# Patient Record
Sex: MALE | Race: WHITE
[De-identification: names, ages, dates, MRNs, and addresses within clinical notes are randomized per-mention and may not be internally consistent; named-entity substitution may affect disease eponyms.]

---

## 2017-01-16 ENCOUNTER — HOSPITAL ENCOUNTER (OUTPATIENT)
Dept: HOSPITAL 53 - M RAD | Age: 67
End: 2017-01-16
Attending: NURSE PRACTITIONER
Payer: MEDICARE

## 2017-01-16 DIAGNOSIS — E78.00: ICD-10-CM

## 2017-01-16 DIAGNOSIS — I10: Primary | ICD-10-CM

## 2017-01-16 DIAGNOSIS — K80.20: ICD-10-CM

## 2017-01-16 DIAGNOSIS — Z82.49: ICD-10-CM

## 2017-01-16 NOTE — REP
Abdominal aortic sonography:

 

History:  Family history of abdominal aortic aneurysm.  Hypercholesterolemia.

Tobacco use.

 

Findings:  Incidental note is made of an 8 mm gallstone in the gallbladder.

Common bile duct is normal measuring 0.3 cm.  The abdominal aorta measures 1.9 x

2.1 cm in AP by transverse dimension at the diaphragmatic hiatus.  At mid aorta,

the corresponding dimensions are 1.9 x 1.9 cm.  There is mild atherosclerotic

change throughout the abdominal aorta.  The distal aorta measures 1.6 x 2.1 cm.

No aneurysm is seen.  Right and left common iliac arteries are normal in caliber

measuring 1.0 and 0.7 cm in AP dimension respectively.

 

Impression:

 

No evidence of abdominal aortic aneurysm.  Gallstones seen.

 

 

Signed by

Daniel Chandler MD 01/16/2017 03:33 P

## 2017-08-15 ENCOUNTER — HOSPITAL ENCOUNTER (OUTPATIENT)
Dept: HOSPITAL 53 - M LAB REF | Age: 67
End: 2017-08-15
Attending: SURGERY
Payer: COMMERCIAL

## 2017-08-15 DIAGNOSIS — L72.11: Primary | ICD-10-CM

## 2017-09-20 ENCOUNTER — HOSPITAL ENCOUNTER (OUTPATIENT)
Dept: HOSPITAL 53 - M RAD | Age: 67
End: 2017-09-20
Attending: SURGERY
Payer: MEDICARE

## 2017-09-20 DIAGNOSIS — E04.1: Primary | ICD-10-CM

## 2017-09-20 NOTE — REP
Thyroid sonography:

 

History:  Thyroid nodule.

 

Comparison study:  December 5, 2016.  This prior study showed a stable 4.6 cm

solid nodule in the left lobe.  This was biopsied with FNA technique under

ultrasound guidance in May 2016.

 

Findings:  Thyroid isthmus today measures 0.2 cm in thickness.  Right lobe

dimensions are 3.2 x 1.5 x 1.4 cm.  The left thyroid lobe measures 5.7 x 3.5 x

3.3 cm.  A large heterogeneous nodule is again noted somewhat increased in size

measuring 5.4 x 3.2 x 3.2 cm in the left lobe.  There is a cystic component in

the nodule which measures 1.5 cm in greatest diameter.  No nodule or cyst is seen

in the right lobe.

 

Impression:

 

Previously noted large predominantly solid nodule in the left lobe is again seen.

This appears to have increased in size slightly since the prior study

 

 

Signed by

Daniel Chandler MD 09/20/2017 12:11 P

## 2018-01-22 ENCOUNTER — HOSPITAL ENCOUNTER (OUTPATIENT)
Dept: HOSPITAL 53 - M RAD | Age: 68
End: 2018-01-22
Attending: NURSE PRACTITIONER
Payer: MEDICARE

## 2018-01-22 DIAGNOSIS — Z82.49: Primary | ICD-10-CM

## 2018-01-22 PROCEDURE — 76775 US EXAM ABDO BACK WALL LIM: CPT

## 2018-07-22 ENCOUNTER — HOSPITAL ENCOUNTER (OUTPATIENT)
Dept: HOSPITAL 53 - M SFHCLERA | Age: 68
End: 2018-07-22
Attending: NURSE PRACTITIONER
Payer: MEDICARE

## 2018-07-22 DIAGNOSIS — J02.9: Primary | ICD-10-CM

## 2018-07-24 ENCOUNTER — HOSPITAL ENCOUNTER (OUTPATIENT)
Dept: HOSPITAL 53 - M LAB | Age: 68
End: 2018-07-24
Attending: NURSE PRACTITIONER
Payer: MEDICARE

## 2018-07-24 DIAGNOSIS — H26.9: ICD-10-CM

## 2018-07-24 DIAGNOSIS — Z01.812: Primary | ICD-10-CM

## 2018-07-24 DIAGNOSIS — I10: ICD-10-CM

## 2018-07-24 DIAGNOSIS — E11.9: ICD-10-CM

## 2018-07-24 LAB
ANION GAP: 7 MEQ/L (ref 8–16)
BLOOD UREA NITROGEN: 27 MG/DL (ref 7–18)
CALCIUM LEVEL: 9.1 MG/DL (ref 8.8–10.2)
CARBON DIOXIDE LEVEL: 26 MEQ/L (ref 21–32)
CHLORIDE LEVEL: 109 MEQ/L (ref 98–107)
CREATININE FOR GFR: 1.58 MG/DL (ref 0.7–1.3)
GFR SERPL CREATININE-BSD FRML MDRD: 46.7 ML/MIN/{1.73_M2} (ref 49–?)
GLUCOSE, FASTING: 198 MG/DL (ref 70–100)
HEMATOCRIT: 45.2 % (ref 42–52)
HEMOGLOBIN: 15.2 G/DL (ref 13.5–17.5)
MEAN CORPUSCULAR HEMOGLOBIN: 29.6 PG (ref 27–33)
MEAN CORPUSCULAR HGB CONC: 33.6 G/DL (ref 32–36.5)
MEAN CORPUSCULAR VOLUME: 88.1 FL (ref 80–96)
NRBC BLD AUTO-RTO: 0 % (ref 0–0)
PLATELET COUNT, AUTOMATED: 256 10^3/UL (ref 150–450)
POTASSIUM SERUM: 4 MEQ/L (ref 3.5–5.1)
RED BLOOD COUNT: 5.13 10^6/UL (ref 4.3–6.1)
RED CELL DISTRIBUTION WIDTH: 12.9 % (ref 11.5–14.5)
SODIUM LEVEL: 142 MEQ/L (ref 136–145)
WHITE BLOOD COUNT: 11.8 10^3/UL (ref 4–10)

## 2018-07-24 PROCEDURE — 80048 BASIC METABOLIC PNL TOTAL CA: CPT

## 2019-08-06 ENCOUNTER — HOSPITAL ENCOUNTER (EMERGENCY)
Dept: HOSPITAL 53 - M ED | Age: 69
Discharge: HOME | End: 2019-08-06
Payer: MEDICARE

## 2019-08-06 VITALS — SYSTOLIC BLOOD PRESSURE: 165 MMHG | DIASTOLIC BLOOD PRESSURE: 80 MMHG

## 2019-08-06 VITALS — WEIGHT: 225.47 LBS | HEIGHT: 71 IN | BODY MASS INDEX: 31.56 KG/M2

## 2019-08-06 DIAGNOSIS — I25.2: ICD-10-CM

## 2019-08-06 DIAGNOSIS — Z87.891: ICD-10-CM

## 2019-08-06 DIAGNOSIS — E11.9: ICD-10-CM

## 2019-08-06 DIAGNOSIS — E78.5: ICD-10-CM

## 2019-08-06 DIAGNOSIS — Z79.82: ICD-10-CM

## 2019-08-06 DIAGNOSIS — Z79.899: ICD-10-CM

## 2019-08-06 DIAGNOSIS — I10: ICD-10-CM

## 2019-08-06 DIAGNOSIS — K27.9: ICD-10-CM

## 2019-08-06 DIAGNOSIS — K65.4: Primary | ICD-10-CM

## 2019-08-06 DIAGNOSIS — Z88.5: ICD-10-CM

## 2019-08-06 DIAGNOSIS — Z79.02: ICD-10-CM

## 2019-08-06 LAB
ALBUMIN SERPL BCG-MCNC: 4.1 GM/DL (ref 3.2–5.2)
ALT SERPL W P-5'-P-CCNC: 60 U/L (ref 12–78)
BASOPHILS # BLD AUTO: 0 10^3/UL (ref 0–0.2)
BASOPHILS NFR BLD AUTO: 0.4 % (ref 0–1)
BILIRUB CONJ SERPL-MCNC: 0.2 MG/DL (ref 0–0.2)
BILIRUB SERPL-MCNC: 0.4 MG/DL (ref 0.2–1)
BUN SERPL-MCNC: 19 MG/DL (ref 7–18)
CALCIUM SERPL-MCNC: 9.4 MG/DL (ref 8.8–10.2)
CHLORIDE SERPL-SCNC: 103 MEQ/L (ref 98–107)
CK MB CFR.DF SERPL CALC: 1.67
CK MB SERPL-MCNC: 3.3 NG/ML (ref ?–3.6)
CK SERPL-CCNC: 198 U/L (ref 39–308)
CO2 SERPL-SCNC: 28 MEQ/L (ref 21–32)
CREAT SERPL-MCNC: 1.28 MG/DL (ref 0.7–1.3)
EOSINOPHIL # BLD AUTO: 0 10^3/UL (ref 0–0.5)
EOSINOPHIL NFR BLD AUTO: 0.3 % (ref 0–3)
GFR SERPL CREATININE-BSD FRML MDRD: 59.3 ML/MIN/{1.73_M2} (ref 49–?)
GLUCOSE SERPL-MCNC: 166 MG/DL (ref 70–100)
HCT VFR BLD AUTO: 48.4 % (ref 42–52)
HGB BLD-MCNC: 16.3 G/DL (ref 13.5–17.5)
LIPASE SERPL-CCNC: 120 U/L (ref 73–393)
LYMPHOCYTES # BLD AUTO: 0.9 10^3/UL (ref 1.5–4.5)
LYMPHOCYTES NFR BLD AUTO: 13.1 % (ref 24–44)
MCH RBC QN AUTO: 29.8 PG (ref 27–33)
MCHC RBC AUTO-ENTMCNC: 33.7 G/DL (ref 32–36.5)
MCV RBC AUTO: 88.5 FL (ref 80–96)
MONOCYTES # BLD AUTO: 0.3 10^3/UL (ref 0–0.8)
MONOCYTES NFR BLD AUTO: 3.9 % (ref 0–5)
NEUTROPHILS # BLD AUTO: 5.5 10^3/UL (ref 1.8–7.7)
NEUTROPHILS NFR BLD AUTO: 81.9 % (ref 36–66)
PLATELET # BLD AUTO: 226 10^3/UL (ref 150–450)
POTASSIUM SERPL-SCNC: 4.2 MEQ/L (ref 3.5–5.1)
PROT SERPL-MCNC: 7 GM/DL (ref 6.4–8.2)
RBC # BLD AUTO: 5.47 10^6/UL (ref 4.3–6.1)
SODIUM SERPL-SCNC: 139 MEQ/L (ref 136–145)
TROPONIN I SERPL-MCNC: < 0.02 NG/ML (ref ?–0.1)
WBC # BLD AUTO: 6.7 10^3/UL (ref 4–10)

## 2019-08-06 PROCEDURE — 96374 THER/PROPH/DIAG INJ IV PUSH: CPT

## 2019-08-06 PROCEDURE — 83605 ASSAY OF LACTIC ACID: CPT

## 2019-08-06 PROCEDURE — 85025 COMPLETE CBC W/AUTO DIFF WBC: CPT

## 2019-08-06 PROCEDURE — 82550 ASSAY OF CK (CPK): CPT

## 2019-08-06 PROCEDURE — 82553 CREATINE MB FRACTION: CPT

## 2019-08-06 PROCEDURE — 80076 HEPATIC FUNCTION PANEL: CPT

## 2019-08-06 PROCEDURE — 99284 EMERGENCY DEPT VISIT MOD MDM: CPT

## 2019-08-06 PROCEDURE — 93005 ELECTROCARDIOGRAM TRACING: CPT

## 2019-08-06 PROCEDURE — 84484 ASSAY OF TROPONIN QUANT: CPT

## 2019-08-06 PROCEDURE — 96375 TX/PRO/DX INJ NEW DRUG ADDON: CPT

## 2019-08-06 PROCEDURE — 80048 BASIC METABOLIC PNL TOTAL CA: CPT

## 2019-08-06 PROCEDURE — 83690 ASSAY OF LIPASE: CPT

## 2019-08-06 PROCEDURE — 96361 HYDRATE IV INFUSION ADD-ON: CPT

## 2019-08-06 PROCEDURE — 81001 URINALYSIS AUTO W/SCOPE: CPT

## 2019-08-06 PROCEDURE — 74177 CT ABD & PELVIS W/CONTRAST: CPT

## 2019-08-06 NOTE — REPVR
EXAM: 

CT Abdomen and Pelvis With Contrast 



EXAM DATE/TIME: 

8/6/2019 5:11 PM 



CLINICAL HISTORY: 

69 years old, male; Abdominal pain; Generalized; Additional info: Diffuse 

abdominal pain 



TECHNIQUE: 

Imaging protocol: Axial computed tomography images of the abdomen and pelvis 

with intravenous contrast. Coronal and sagittal reformatted images were created 

and reviewed. 

Radiation optimization: All CT scans at this facility use at least one of these 

dose optimization techniques: automated exposure control; mA and/or kV 

adjustment per patient size (includes targeted exams where dose is matched to 

clinical indication); or iterative reconstruction. 

Contrast material: ISOVUE 370;Contrast volume: 100 ml;Contrast route: IV; 



COMPARISON: 

CT ABD PELVIS WITH CONTRAST 6/10/2015 10:32 PM 



FINDINGS: 

Lungs: Linear stranding and groundglass at the lung bases, likely due to 

atelectasis and/or scarring. Bilateral lower lobe calcified granulomata. 

Mediastinum: Small hiatal hernia. 



Liver: Mild hepatomegaly. Diffuse hepatic steatosis. 

Gallbladder and bile ducts: Cholelithiasis. 

Pancreas: Unremarkable. 

Spleen: Approximately 3.8 x 3.7 cm hypervascular lesion in the anterior aspect 

of the spleen, similar to prior and likely a hemangioma. 

Adrenals: Unremarkable. 

Kidneys and ureters: Mild nonspecific bilateral perinephric stranding. No 

radiodense calculi. No hydronephrosis. 

Stomach and bowel: Scattered colonic diverticula without evidence of 

diverticulitis. No obstruction. No bowel wall thickening. No pneumatosis. 

Appendix: Appendix not identified with certainty but no right lower quadrant 

inflammatory change to suggest acute appendicitis. 

Intraperitoneal space: Subtle, hazy infiltration of the mesenteric fat. 

Vasculature: Mild to moderate atherosclerotic disease. No aneurysm. 

Lymph nodes: Multiple small mesenteric lymph nodes, nonspecific in appearance. 

No pathologically enlarged lymph nodes. 



Bladder: Unremarkable. 

Reproductive: Unremarkable. 

Bones/joints: No acute osseous abnormality. Osteopenia. Mild degenerative 

changes. 

Soft tissues: Unremarkable. 



IMPRESSION: 

1. Hazy infiltration of the mesenteric fat with multiple small mesenteric lymph 

nodes, suggestive of trevon mesentery, a nonspecific finding which can be seen 

with mesenteric panniculitis. 

2. Additional findings, as above. 



Electronically signed by: Romulo Abdi On 08/06/2019  18:18:11 PM

## 2019-08-06 NOTE — ECGEPIP
[facility] - ED

                                       

                                       Test Date:    2019

Pat Name:     YINA ARIZMENDI             Department:   

Patient ID:   [id number]                 Room:         -

Gender:       Male                     Technician:   [email or web address]

:          1950               Requested By: GINA WILSON PA-C

Order Number: [id number]     Reading MD:   Earl Urbina

                                 Measurements

Intervals                              Axis          

Rate:         74                       P:            6

TN:           191                      QRS:          26

QRSD:         106                      T:            31

QT:           422                                    

QTc:          469                                    

                           Interpretive Statements

SINUS RHYTHM WITH OCCASIONAL VENTRICULAR PREMATURE COMPLEXES

SIMILAR TO 6/11/15

Electronically Signed on 2019 20:06:29 EDT by Earl Urbina

## 2019-08-16 ENCOUNTER — HOSPITAL ENCOUNTER (OUTPATIENT)
Dept: HOSPITAL 53 - M MSPAV | Age: 69
Setting detail: OBSERVATION
LOS: 3 days | Discharge: HOME | End: 2019-08-19
Attending: INTERNAL MEDICINE | Admitting: INTERNAL MEDICINE
Payer: MEDICARE

## 2019-08-16 VITALS — WEIGHT: 205.03 LBS | BODY MASS INDEX: 28.7 KG/M2 | HEIGHT: 71 IN

## 2019-08-16 VITALS — SYSTOLIC BLOOD PRESSURE: 117 MMHG | DIASTOLIC BLOOD PRESSURE: 93 MMHG

## 2019-08-16 DIAGNOSIS — R11.2: Primary | ICD-10-CM

## 2019-08-16 DIAGNOSIS — Z88.5: ICD-10-CM

## 2019-08-16 DIAGNOSIS — Z79.02: ICD-10-CM

## 2019-08-16 DIAGNOSIS — E11.69: ICD-10-CM

## 2019-08-16 DIAGNOSIS — Z79.82: ICD-10-CM

## 2019-08-16 DIAGNOSIS — K31.84: ICD-10-CM

## 2019-08-16 DIAGNOSIS — E87.6: ICD-10-CM

## 2019-08-16 DIAGNOSIS — Z79.899: ICD-10-CM

## 2019-08-16 LAB
ALBUMIN SERPL BCG-MCNC: 3.6 GM/DL (ref 3.2–5.2)
ALT SERPL W P-5'-P-CCNC: 70 U/L (ref 12–78)
BILIRUB SERPL-MCNC: 0.4 MG/DL (ref 0.2–1)
BUN SERPL-MCNC: 16 MG/DL (ref 7–18)
CALCIUM SERPL-MCNC: 9 MG/DL (ref 8.8–10.2)
CHLORIDE SERPL-SCNC: 105 MEQ/L (ref 98–107)
CO2 SERPL-SCNC: 30 MEQ/L (ref 21–32)
CREAT SERPL-MCNC: 1.32 MG/DL (ref 0.7–1.3)
GFR SERPL CREATININE-BSD FRML MDRD: 57.3 ML/MIN/{1.73_M2} (ref 49–?)
GLUCOSE SERPL-MCNC: 114 MG/DL (ref 70–100)
HCT VFR BLD AUTO: 50.5 % (ref 42–52)
HGB BLD-MCNC: 16.7 G/DL (ref 13.5–17.5)
INR PPP: 1.01
MCH RBC QN AUTO: 29.5 PG (ref 27–33)
MCHC RBC AUTO-ENTMCNC: 33.1 G/DL (ref 32–36.5)
MCV RBC AUTO: 89.2 FL (ref 80–96)
PLATELET # BLD AUTO: 227 10^3/UL (ref 150–450)
POTASSIUM SERPL-SCNC: 3.9 MEQ/L (ref 3.5–5.1)
PROT SERPL-MCNC: 6.8 GM/DL (ref 6.4–8.2)
PROTHROMBIN TIME: 13 SECONDS (ref 11.8–14)
RBC # BLD AUTO: 5.66 10^6/UL (ref 4.3–6.1)
SODIUM SERPL-SCNC: 140 MEQ/L (ref 136–145)
WBC # BLD AUTO: 9.6 10^3/UL (ref 4–10)

## 2019-08-16 PROCEDURE — 80053 COMPREHEN METABOLIC PANEL: CPT

## 2019-08-16 PROCEDURE — 85610 PROTHROMBIN TIME: CPT

## 2019-08-16 PROCEDURE — 96374 THER/PROPH/DIAG INJ IV PUSH: CPT

## 2019-08-16 PROCEDURE — 83036 HEMOGLOBIN GLYCOSYLATED A1C: CPT

## 2019-08-16 PROCEDURE — 85027 COMPLETE CBC AUTOMATED: CPT

## 2019-08-16 PROCEDURE — 80048 BASIC METABOLIC PNL TOTAL CA: CPT

## 2019-08-16 PROCEDURE — 78264 GASTRIC EMPTYING IMG STUDY: CPT

## 2019-08-16 PROCEDURE — 96376 TX/PRO/DX INJ SAME DRUG ADON: CPT

## 2019-08-16 PROCEDURE — 83735 ASSAY OF MAGNESIUM: CPT

## 2019-08-16 PROCEDURE — 36415 COLL VENOUS BLD VENIPUNCTURE: CPT

## 2019-08-16 PROCEDURE — 96361 HYDRATE IV INFUSION ADD-ON: CPT

## 2019-08-16 RX ADMIN — ACETAMINOPHEN SCH MG: 500 TABLET ORAL at 21:00

## 2019-08-16 RX ADMIN — DEXTROSE MONOHYDRATE SCH MG: 50 INJECTION, SOLUTION INTRAVENOUS at 22:27

## 2019-08-16 RX ADMIN — ONDANSETRON SCH MG: 2 INJECTION INTRAMUSCULAR; INTRAVENOUS at 22:27

## 2019-08-16 RX ADMIN — SODIUM CHLORIDE SCH MLS/HR: 9 INJECTION, SOLUTION INTRAVENOUS at 22:08

## 2019-08-16 RX ADMIN — INSULIN LISPRO SCH UNITS: 100 INJECTION, SOLUTION INTRAVENOUS; SUBCUTANEOUS at 21:00

## 2019-08-16 NOTE — HPEPDOC
[facility] Medical History & Physical


Date of Admission


Aug 16, 2019


Date of Service:  Aug 16, 2019


Primary Care Physician:  Lauren Jones N.P.


Attending Physician:  JIMMY SIEGEL MD





History and Physical


Time of service 10:50 PM





CHIEF COMPLAINT: Transfer from outside hospital





HISTORY OF PRESENT ILLNESS:


This is a 69-year-old male who was transferred from [facility] for a 

higher level of care. He came to [facility] on August 9 with nausea, and 

intractable vomiting. He had a CT of the abdomen that showed mesenteric 

panniculitis and was given a course of steroids, nevertheless, he continued to 

vomit at home. Shortly there after he presented to [facility] where he 

was admitted for management of near syncope along with nausea and vomiting. 

Based on their notes a Carotid duplex, CT of the head, and right upper quadrant 

ultrasound were done. The CDs have been sent here with him.


Currently the patient reports still feeling nauseous and had vomited on the way 

to the hospital. Since arrival, he has not vomited. He denies having bloody 

emesis.





REVIEW OF SYSTEMS: 12 point review of systems negative except as listed in HPI





PAST MEDICAL /SURGICAL HISTORY:


1. Chronic hypertension. 


2. Non-insulin-dependent


3 Chronic CAD that is post CABG


4. Gallstones.


5. Fatty liver.


6. GERD/ hiatial hernia / peptic ulcer disease


7. Hypothyroidism status post thyroidectomy for thyroid cancer


8 History of SBO twice


9. Status post appendectomy. 





SOCIAL HISTORY:


Former smoker.


Denies alcohol use





FAMILY HISTORY:


Coronary artery disease





ALLERGIES: Please see below.





HOME MEDICATIONS: Please see below. 





PHYSICAL EXAMINATION:


VITAL SIGNS: See below


GENERAL APPEARANCE: Well-nourished, well-developed, not in apparent distress


HEENT: normocephalic, atraumatic, mucous members moist and pink


CARDIOVASCULAR: Regular rate and rhythm. No murmurs, rubs or gallops


LUNGS: Clear to auscultation bilaterally on room air


ABDOMEN: Positive bowel sounds. Abdomen firm but not tender on palpation


MUSCULOSKELETAL: Range of motion intact 4 extremities


NEUROLOGICAL: Cranial nerves II-12 grossly intact. Speech not dysarthric


PSYCHIATRIC: Alert and oriented to person, place and time, able to understand 

and follow commands





LABORATORY DATA: See below.





IMAGING: [facility]





MICROBIOLOGY: Please see below. 





ASSESSMENT: 


Mr. Silva is a 69-year-old male with a past medical history of coronary artery 

disease, non-insulin dependent diabetes, chronic hypertension, fatty liver, 

GERD, hiatial hernia, peptic ulcer disease, and history of small bowel 

obstruction was transferred from [facility] for higher level of care.


.


PLAN:


1. N/V


Cause to be determined


Suspect the patient may have diabetic gastroparesis


He also has a history of gallstones, GERD/ hiatial hernia / peptic ulcer disease


Plan: admit GMF / nothing by mouth/IV fluids/ daytime team may consider a GI or 

general surgery consult for EGD pending the results of agastric emptying study 

and revision of images done at [facility]/ Zofran, PPI, Reglan





2. Non-insulin-dependent


Plan:  f/u accuchecks & A1C / hypoglycemia protocol / sliding scale / hold oral 

anti-glycemics  





3. Chronic hypertension. 


Plan: Continue home meds





4 Chronic CAD/status post CABG


Plan: Continue home meds





5.Hypothyroidism 


status post thyroidectomy for thyroid cancer.


Plan: Continue home meds





DVT prophylaxis SCDs 





Disposition pending clinical course





Home Medications


Scheduled


Acetaminophen (Tylenol Extra Strength) 500 Mg Tablet, 1,000 MG PO QHS


Amlodipine Besylate (Amlodipine Besylate) 5 Mg Tab, 5 MG PO BID


Amoxicillin (Amoxicillin) 500 Mg Capsule, 500 MG PO TID


   PRESCRIBED 8/12/19 TID X7 DAYS 


Aspirin (Aspir 81) 81 Mg Tab, 81 MG PO DAILY


Atenolol (Atenolol) 25 Mg Tab, 25 MG PO BID


Atorvastatin Calcium (Lipitor) 20 Mg Tab, 20 MG PO QPM


Candesartan Cilexetil (Candesartan Cilexetil) 32 Mg Tablet, 16 MG PO BID


Clonidine HCl (Clonidine HCl) 0.2 Mg Tab, 0.2 MG PO BID


Clopidogrel Bisulfate (Plavix) 75 Mg Tab, 75 MG PO DAILY


Diphenhydramine HCl (Sleep-Aid) 25 Mg Capsule, 25 MG PO QHS


Ergocalciferol (Vitamin D2) (Vitamin D2) 50,000 Unit Capsule, 50,000 UNIT PO 

QWEEK


   MONDAYS 


Fenofibrate,Micronized (Fenofibrate) 200 Mg Capsule, 200 MG PO DAILY


Fluticasone Propionate (Flonase Allergy Relief) 9.9 Ml [place].susp, 1 SPRAY NARES

DAILY


Glimepiride (Glimepiride) 1 Mg Tablet, 1 MG PO DAILY


Hydrochlorothiazide (Hydrochlorothiazide) 50 Mg Tab, 50 MG PO DAILY


Levothyroxine Sodium (Synthroid) 175 Mcg Tablet, 175 MCG PO QAM


Loratadine (Claritin) 10 Mg Tablet, 10 MG PO DAILY


Melatonin (Melatonin) 10 Mg Capsule, 10 MG PO QHS


Pantoprazole Sodium (Pantoprazole Sodium) 40 Mg Tablet.dr, 40 MG PO DAILY





Scheduled PRN


Nitroglycerin (Nitroglycerin) 0.4 Mg Tab.subl, 0.4 MG SL Q5MP PRN for CHEST PAIN


   1st sign of attack; may repeat every 5 mins; if pain persists after 3 in 15 

min, medical attention is recommended 


Ondansetron HCl (Ondansetron HCl) 8 Mg Tablet, 8 MG PO TID PRN for NAUSEA OR 

VOMITING





Allergies


Coded Allergies:  


     morphine (Verified  Allergy, Severe, heavy chest, 8/6/19)





A-FIB/CHADSVASC


A-FIB History


Current/History of A-Fib/PAF?:  No


Current PO Anticoag Therapy:  No











JIMMY SIEGEL MD                Aug 16, 2019 21:26

## 2019-08-17 VITALS — DIASTOLIC BLOOD PRESSURE: 76 MMHG | SYSTOLIC BLOOD PRESSURE: 146 MMHG

## 2019-08-17 VITALS — SYSTOLIC BLOOD PRESSURE: 168 MMHG | DIASTOLIC BLOOD PRESSURE: 88 MMHG

## 2019-08-17 VITALS — DIASTOLIC BLOOD PRESSURE: 72 MMHG | SYSTOLIC BLOOD PRESSURE: 149 MMHG

## 2019-08-17 LAB
BUN SERPL-MCNC: 17 MG/DL (ref 7–18)
CALCIUM SERPL-MCNC: 9 MG/DL (ref 8.8–10.2)
CHLORIDE SERPL-SCNC: 108 MEQ/L (ref 98–107)
CO2 SERPL-SCNC: 27 MEQ/L (ref 21–32)
CREAT SERPL-MCNC: 1.24 MG/DL (ref 0.7–1.3)
EST. AVERAGE GLUCOSE BLD GHB EST-MCNC: 154 MG/DL (ref 60–110)
GFR SERPL CREATININE-BSD FRML MDRD: > 60 ML/MIN/{1.73_M2} (ref 49–?)
GLUCOSE SERPL-MCNC: 110 MG/DL (ref 70–100)
HCT VFR BLD AUTO: 46.6 % (ref 42–52)
HGB BLD-MCNC: 15.7 G/DL (ref 13.5–17.5)
MAGNESIUM SERPL-MCNC: 2.1 MG/DL (ref 1.8–2.4)
MCH RBC QN AUTO: 28.9 PG (ref 27–33)
MCHC RBC AUTO-ENTMCNC: 33.7 G/DL (ref 32–36.5)
MCV RBC AUTO: 85.8 FL (ref 80–96)
PLATELET # BLD AUTO: 225 10^3/UL (ref 150–450)
POTASSIUM SERPL-SCNC: 3.4 MEQ/L (ref 3.5–5.1)
RBC # BLD AUTO: 5.43 10^6/UL (ref 4.3–6.1)
SODIUM SERPL-SCNC: 141 MEQ/L (ref 136–145)
WBC # BLD AUTO: 8.3 10^3/UL (ref 4–10)

## 2019-08-17 RX ADMIN — ONDANSETRON SCH MG: 2 INJECTION INTRAMUSCULAR; INTRAVENOUS at 18:22

## 2019-08-17 RX ADMIN — DEXTROSE MONOHYDRATE SCH MG: 50 INJECTION, SOLUTION INTRAVENOUS at 21:40

## 2019-08-17 RX ADMIN — INSULIN LISPRO SCH UNITS: 100 INJECTION, SOLUTION INTRAVENOUS; SUBCUTANEOUS at 21:00

## 2019-08-17 RX ADMIN — ATORVASTATIN CALCIUM SCH MG: 20 TABLET, FILM COATED ORAL at 18:22

## 2019-08-17 RX ADMIN — ONDANSETRON SCH MG: 2 INJECTION INTRAMUSCULAR; INTRAVENOUS at 14:40

## 2019-08-17 RX ADMIN — ACETAMINOPHEN SCH MG: 500 TABLET ORAL at 21:41

## 2019-08-17 RX ADMIN — ACETAMINOPHEN SCH MG: 500 TABLET ORAL at 21:00

## 2019-08-17 RX ADMIN — ONDANSETRON SCH MG: 2 INJECTION INTRAMUSCULAR; INTRAVENOUS at 10:09

## 2019-08-17 RX ADMIN — INSULIN LISPRO SCH UNITS: 100 INJECTION, SOLUTION INTRAVENOUS; SUBCUTANEOUS at 07:30

## 2019-08-17 RX ADMIN — FLUTICASONE PROPIONATE SCH SPRAY: 50 SPRAY, METERED NASAL at 10:09

## 2019-08-17 RX ADMIN — INSULIN LISPRO SCH UNITS: 100 INJECTION, SOLUTION INTRAVENOUS; SUBCUTANEOUS at 17:13

## 2019-08-17 RX ADMIN — ASPIRIN SCH MG: 81 TABLET ORAL at 10:09

## 2019-08-17 RX ADMIN — CLOPIDOGREL BISULFATE SCH MG: 75 TABLET ORAL at 10:11

## 2019-08-17 RX ADMIN — ONDANSETRON SCH MG: 2 INJECTION INTRAMUSCULAR; INTRAVENOUS at 02:11

## 2019-08-17 RX ADMIN — INSULIN LISPRO SCH UNITS: 100 INJECTION, SOLUTION INTRAVENOUS; SUBCUTANEOUS at 12:00

## 2019-08-17 RX ADMIN — CANDESARTAN SCH MG: 16 TABLET ORAL at 10:09

## 2019-08-17 RX ADMIN — ATENOLOL SCH MG: 25 TABLET ORAL at 21:34

## 2019-08-17 RX ADMIN — GLIMEPIRIDE SCH MG: 1 TABLET ORAL at 09:00

## 2019-08-17 RX ADMIN — ONDANSETRON SCH MG: 2 INJECTION INTRAMUSCULAR; INTRAVENOUS at 06:21

## 2019-08-17 RX ADMIN — CANDESARTAN SCH MG: 16 TABLET ORAL at 21:33

## 2019-08-17 RX ADMIN — ONDANSETRON SCH MG: 2 INJECTION INTRAMUSCULAR; INTRAVENOUS at 21:41

## 2019-08-17 RX ADMIN — SODIUM CHLORIDE SCH MLS/HR: 9 INJECTION, SOLUTION INTRAVENOUS at 14:40

## 2019-08-17 RX ADMIN — ATENOLOL SCH MG: 25 TABLET ORAL at 10:11

## 2019-08-17 NOTE — IPNPDOC
Subjective


Date Seen


The patient was seen on 8/17/19.





Subjective


Chief Complaint/HPI


Patient's symptoms are improving but is still has some nausea. No abdominal pain


General:  Denies: ROS Unobtainable, Chills, Night Sweats, Fatigue, Malaise, 

Normal Appetite, Other Symptoms


Constitutional:  Denies: Chills, Fever, Malaise, Night Sweats, Weakness, 

Fatigue, Weight Loss, Lethargy, Other


Eyes:  Denies: Pain, Vision change, Conjunctivae inflammation, Eyelid 

inflammation, Redness, Other


ENT:  Denies: Head Aches, Ear Pain, Dysphagia, Sinus Congestion, Post Nasal 

Drip, Sore Throat, Epistaxis, Other Symptoms


Skin:  Denies: Rash, Lesions, Jaundice, Bruising, Itching, Dry, Breakdown, Nail 

Changes, Other


Pulmonary:  Denies: Dyspnea, Cough, Pleuritic Chest Pain, Other Symptoms


Gastrointestinal:  Reports: Nausea; 


   Denies: Vomiting, Abdominal Pain, Diarrhea, Constipation, Melena, Hemato

chezia, Other Symptoms


Endocrine:  Denies: Polydipsia, Polyphagia, Polyuria, Heat Intolerance, Cold 

Intolerance, Other Endocrine Sx


Musculoskeletal:  Denies: Neck Pain, Back Pain, Shoulder Pain, Arm Pain, Hand 

Pain, Leg Pain, Foot Pain, Joint Pain, Muscle Pain, Spasms, Other Symptoms


Psych:  Denies: Mood Normal, Anxiety, Depression, Memory Issues, Thoughts of 

Self Harm, Anger, Thoughts of Harming Other, Other Psych





Objective


Physical Examination


General Exam:  Positive: Alert


Eye Exam:  Positive: PERRLA, Conjunctiva & lids normal


ENT Exam:  Positive: Atraumatic, Mucous membr. moist/pink


Neck Exam:  Positive: Supple


Chest Exam:  Positive: Clear to auscultation, Normal air movement


Heart Exam:  Positive: Rate Normal, Normal S1, Normal S2


Abdomen Exam:  Positive: Normal bowel sounds, Soft


Extremity Exam:  Positive: Normal pulses


Skin Exam:  Positive: Nl turgor and temperature


Neuro Exam:  Positive: Normal Gait, Normal Speech, Strength at 5/5 X4 ext, 

Sensation Intact





Assessment /Plan


Problems





(1) Intractable vomiting with nausea


Status:  Acute


Problem Text:  This is a 69-year-old male who was transferred from [facility] for a higher level of care. He to [facility] on August 9 with nausea, 

and intractable vomiting. He had a CT abdomen that showed mesenteric 

panniculitis and he is given a course of steroids. Nevertheless, he continued to

vomit at home. Really there after he presented to [facility] where he was

admitted for management of near syncope along with nausea and vomiting. Based on

their notes . Carotid duplex, CT of the head, and right upper quadrant 

ultrasound were done. The CDs have been sent with him.


Currently the patient reports still feeling nauseous and vomited on the way to 

the hospital. Since arrival, he has not vomited. He denies having episodes of 

bloody emesis





Patient seems to be responding to conservative management and has decreased 

vomiting but has still has some nausea


, Most likely suggestive diabetic gastroparesis nature


Will start Reglan 5 mg IV every 6 hours


Repeat all labs in a.m.


Clear liquid diet


Close monitoring





(2) Hypokalemia


Status:  Acute


Problem Text:  Corrected


The level in a.m.








Plan/VTE


VTE Prophylaxis Ordered?:  Yes





VS, I&O, 24H, Fishbone


Vital Signs/I&O





Vital Signs








  Date Time  Temp Pulse Resp B/P (MAP) Pulse Ox O2 Delivery O2 Flow Rate FiO2


 


8/17/19 10:11    168/88    


 


8/17/19 10:11  75      


 


8/17/19 06:00 98.1  18  97   














I&O- Last 24 Hours up to 6 AM 


 


 8/17/19





 06:00


 


Intake Total 360 ml


 


Output Total 550 ml


 


Balance -190 ml











Laboratory Data


24H LABS


Laboratory Tests 2


8/16/19 21:58: Bedside Glucose (Misc Panel) 111


8/16/19 22:02: 


Nucleated Red Blood Cells % (auto) 0.0, Prothrombin Time 13.0, Prothromb Time 

International Ratio 1.01, Anion Gap 5L, Glomerular Filtration Rate 57.3, Blood 

Urea Nitrogen 16, Creatinine 1.32H, Sodium Level 140, Potassium Level 3.9, 

Chloride Level 105, Carbon Dioxide Level 30, Calcium Level 9.0, Aspartate Amino 

Transf (AST/SGOT) 43H, Alanine Aminotransferase (ALT/SGPT) 70, Alkaline 

Phosphatase 55, Total Bilirubin 0.4, Total Protein 6.8, Albumin 3.6, 

Albumin/Globulin Ratio 1.13


8/17/19 06:49: 


Nucleated Red Blood Cells % (auto) 0.0, Anion Gap 6L, Glomerular Filtration Rate

> 60.0, Blood Urea Nitrogen 17, Creatinine 1.24, Sodium Level 141, Potassium 

Level 3.4L, Chloride Level 108H, Carbon Dioxide Level 27, Calcium Level 9.0, 

Estimated Mean Plasma Glucose 154H, Hemoglobin A1c 7.0, Magnesium Level 2.1


8/17/19 11:35: Bedside Glucose (Misc Panel) 117H


CBC/BMP


Laboratory Tests


8/16/19 22:02








Red Blood Count 5.66, Mean Corpuscular Volume 89.2, Mean Corpuscular Hemoglobin 

29.5, Mean Corpuscular Hemoglobin Concent 33.1, Red Cell Distribution Width 

13.2, Calcium Level 9.0, Aspartate Amino Transf (AST/SGOT) 43 H, Alanine 

Aminotransferase (ALT/SGPT) 70, Alkaline Phosphatase 55, Total Bilirubin 0.4, 

Total Protein 6.8, Albumin 3.6





8/17/19 06:49








Red Blood Count 5.43, Mean Corpuscular Volume 85.8, Mean Corpuscular Hemoglobin 

28.9, Mean Corpuscular Hemoglobin Concent 33.7, Red Cell Distribution Width 

13.2, Calcium Level 9.0











JUSTIN HAZEL MD              Aug 17, 2019 11:57

## 2019-08-18 VITALS — DIASTOLIC BLOOD PRESSURE: 69 MMHG | SYSTOLIC BLOOD PRESSURE: 118 MMHG

## 2019-08-18 VITALS — DIASTOLIC BLOOD PRESSURE: 71 MMHG | SYSTOLIC BLOOD PRESSURE: 150 MMHG

## 2019-08-18 VITALS — DIASTOLIC BLOOD PRESSURE: 66 MMHG | SYSTOLIC BLOOD PRESSURE: 121 MMHG

## 2019-08-18 VITALS — SYSTOLIC BLOOD PRESSURE: 176 MMHG | DIASTOLIC BLOOD PRESSURE: 86 MMHG

## 2019-08-18 VITALS — DIASTOLIC BLOOD PRESSURE: 71 MMHG | SYSTOLIC BLOOD PRESSURE: 142 MMHG

## 2019-08-18 LAB
ALBUMIN SERPL BCG-MCNC: 3.3 GM/DL (ref 3.2–5.2)
ALT SERPL W P-5'-P-CCNC: 68 U/L (ref 12–78)
BILIRUB SERPL-MCNC: 0.6 MG/DL (ref 0.2–1)
BUN SERPL-MCNC: 16 MG/DL (ref 7–18)
CALCIUM SERPL-MCNC: 8.9 MG/DL (ref 8.8–10.2)
CHLORIDE SERPL-SCNC: 106 MEQ/L (ref 98–107)
CO2 SERPL-SCNC: 27 MEQ/L (ref 21–32)
CREAT SERPL-MCNC: 1.29 MG/DL (ref 0.7–1.3)
GFR SERPL CREATININE-BSD FRML MDRD: 58.8 ML/MIN/{1.73_M2} (ref 49–?)
GLUCOSE SERPL-MCNC: 93 MG/DL (ref 70–100)
HCT VFR BLD AUTO: 48.5 % (ref 42–52)
HGB BLD-MCNC: 16.5 G/DL (ref 13.5–17.5)
MCH RBC QN AUTO: 29.9 PG (ref 27–33)
MCHC RBC AUTO-ENTMCNC: 34 G/DL (ref 32–36.5)
MCV RBC AUTO: 88 FL (ref 80–96)
PLATELET # BLD AUTO: 228 10^3/UL (ref 150–450)
POTASSIUM SERPL-SCNC: 3.9 MEQ/L (ref 3.5–5.1)
PROT SERPL-MCNC: 6.1 GM/DL (ref 6.4–8.2)
RBC # BLD AUTO: 5.51 10^6/UL (ref 4.3–6.1)
SODIUM SERPL-SCNC: 140 MEQ/L (ref 136–145)
WBC # BLD AUTO: 7.3 10^3/UL (ref 4–10)

## 2019-08-18 RX ADMIN — METOCLOPRAMIDE SCH MG: 10 TABLET ORAL at 20:48

## 2019-08-18 RX ADMIN — GLIMEPIRIDE SCH MG: 1 TABLET ORAL at 10:35

## 2019-08-18 RX ADMIN — INSULIN LISPRO SCH UNITS: 100 INJECTION, SOLUTION INTRAVENOUS; SUBCUTANEOUS at 12:00

## 2019-08-18 RX ADMIN — ATENOLOL SCH MG: 25 TABLET ORAL at 10:37

## 2019-08-18 RX ADMIN — ONDANSETRON SCH MG: 2 INJECTION INTRAMUSCULAR; INTRAVENOUS at 18:09

## 2019-08-18 RX ADMIN — ONDANSETRON SCH MG: 2 INJECTION INTRAMUSCULAR; INTRAVENOUS at 06:42

## 2019-08-18 RX ADMIN — ONDANSETRON SCH MG: 2 INJECTION INTRAMUSCULAR; INTRAVENOUS at 14:36

## 2019-08-18 RX ADMIN — LEVOTHYROXINE SODIUM SCH MCG: 75 TABLET ORAL at 06:41

## 2019-08-18 RX ADMIN — CLOPIDOGREL BISULFATE SCH MG: 75 TABLET ORAL at 10:35

## 2019-08-18 RX ADMIN — ATORVASTATIN CALCIUM SCH MG: 20 TABLET, FILM COATED ORAL at 18:09

## 2019-08-18 RX ADMIN — ATENOLOL SCH MG: 25 TABLET ORAL at 20:48

## 2019-08-18 RX ADMIN — INSULIN LISPRO SCH UNITS: 100 INJECTION, SOLUTION INTRAVENOUS; SUBCUTANEOUS at 07:30

## 2019-08-18 RX ADMIN — INSULIN LISPRO SCH UNITS: 100 INJECTION, SOLUTION INTRAVENOUS; SUBCUTANEOUS at 21:00

## 2019-08-18 RX ADMIN — METOCLOPRAMIDE SCH MG: 10 TABLET ORAL at 18:09

## 2019-08-18 RX ADMIN — CANDESARTAN SCH MG: 16 TABLET ORAL at 10:35

## 2019-08-18 RX ADMIN — FLUTICASONE PROPIONATE SCH SPRAY: 50 SPRAY, METERED NASAL at 10:37

## 2019-08-18 RX ADMIN — ONDANSETRON SCH MG: 2 INJECTION INTRAMUSCULAR; INTRAVENOUS at 02:34

## 2019-08-18 RX ADMIN — CANDESARTAN SCH MG: 16 TABLET ORAL at 20:48

## 2019-08-18 RX ADMIN — ACETAMINOPHEN SCH MG: 500 TABLET ORAL at 20:47

## 2019-08-18 RX ADMIN — METOCLOPRAMIDE SCH MG: 10 TABLET ORAL at 10:35

## 2019-08-18 RX ADMIN — INSULIN LISPRO SCH UNITS: 100 INJECTION, SOLUTION INTRAVENOUS; SUBCUTANEOUS at 17:30

## 2019-08-18 RX ADMIN — LEVOTHYROXINE SODIUM SCH MCG: 100 TABLET ORAL at 06:41

## 2019-08-18 RX ADMIN — SODIUM CHLORIDE SCH MLS/HR: 9 INJECTION, SOLUTION INTRAVENOUS at 06:39

## 2019-08-18 RX ADMIN — DEXTROSE MONOHYDRATE SCH MG: 50 INJECTION, SOLUTION INTRAVENOUS at 20:52

## 2019-08-18 RX ADMIN — ONDANSETRON SCH MG: 2 INJECTION INTRAMUSCULAR; INTRAVENOUS at 10:37

## 2019-08-18 RX ADMIN — ONDANSETRON SCH MG: 2 INJECTION INTRAMUSCULAR; INTRAVENOUS at 20:52

## 2019-08-18 RX ADMIN — ASPIRIN SCH MG: 81 TABLET ORAL at 10:35

## 2019-08-18 NOTE — IPNPDOC
Subjective


Date Seen


The patient was seen on 8/18/19.





Subjective


Chief Complaint/HPI


Patient is no more nausea, vomiting, but he is still scared to eat offers no new

complaints at the present time


General:  Denies: ROS Unobtainable, Chills, Night Sweats, Fatigue, Malaise, 

Normal Appetite, Other Symptoms


Constitutional:  Denies: Chills, Fever, Malaise, Night Sweats, Weakness, 

Fatigue, Weight Loss, Lethargy, Other


Eyes:  Denies: Pain, Vision change, Conjunctivae inflammation, Eyelid 

inflammation, Redness, Other


ENT:  Denies: Head Aches, Ear Pain, Dysphagia, Sinus Congestion, Post Nasal 

Drip, Sore Throat, Epistaxis, Other Symptoms


Skin:  Denies: Rash, Lesions, Jaundice, Bruising, Itching, Dry, Breakdown, Nail 

Changes, Other


Cardiovascular:  Denies: Chest Pain, Palpitations, Orthopnea, Paroxysmal Noc. 

Dyspnea, Edema, Lt Headedness, Other Symptoms


Gastrointestinal:  Denies: Nausea, Vomiting, Abdominal Pain, Diarrhea, 

Constipation, Melena, Hematochezia, Other Symptoms


Endocrine:  Denies: Polydipsia, Polyphagia, Polyuria, Heat Intolerance, Cold 

Intolerance, Other Endocrine Sx


Musculoskeletal:  Denies: Neck Pain, Back Pain, Shoulder Pain, Arm Pain, Hand 

Pain, Leg Pain, Foot Pain, Joint Pain, Muscle Pain, Spasms, Other Symptoms


Neurological:  Denies: Weakness, Numbness, Incoordination, Change in speech, 

Confusion, Seizures, Other Symptoms





Objective


Physical Examination


General Exam:  Positive: Alert


Eye Exam:  Positive: PERRLA, Conjunctiva & lids normal


ENT Exam:  Positive: Atraumatic, Mucous membr. moist/pink


Neck Exam:  Positive: Supple


Chest Exam:  Positive: Clear to auscultation, Normal air movement


Heart Exam:  Positive: Rate Normal, Normal S1, Normal S2


Abdomen Exam:  Positive: Normal bowel sounds, Soft


Extremity Exam:  Positive: Normal pulses


Skin Exam:  Positive: Nl turgor and temperature


Neuro Exam:  Positive: Normal Gait, Normal Speech, Strength at 5/5 X4 ext, 

Sensation Intact





Assessment /Plan


Problems





(1) Intractable vomiting with nausea


Status:  Acute


Problem Text:  This is a 69-year-old male who was transferred from [facility] for a higher level of care. He to [facility] on August 9 with nausea, 

and intractable vomiting. He had a CT abdomen that showed mesenteric 

panniculitis and he is given a course of steroids. Nevertheless, he continued to

vomit at home. Really there after he presented to [facility] where he was

admitted for management of near syncope along with nausea and vomiting. Based on

their notes . Carotid duplex, CT of the head, and right upper quadrant 

ultrasound were done. The CDs have been sent with him.


Currently the patient reports still feeling nauseous and vomited on the way to 

the hospital. Since arrival, he has not vomited. He denies having episodes of 

bloody emesis





Patient seems to be responding very well to with the Reglan and conservative 

management


Will start him on regular diet today


Even though will continue IV fluids. In the meantime


Will change Reglan to by mouth every before meals and at bedtime


If patient has no more nausea, vomiting, and he can tolerate by mouth feeding, 

then he can be discharged home





(2) Hypokalemia


Status:  Acute


Problem Text:  Corrected


Labwork within normal limits





(3) Diabetic gastroparesis associated with type 2 diabetes mellitus


Status:  Chronic


Problem Text:  As above





(4) Diabetes mellitus


Status:  Chronic


Response to Treatment:  Stable


Problem Text:  Continue home meds








Plan/VTE


VTE Prophylaxis Ordered?:  Yes





VS, I&O, 24H, Fishbone


Vital Signs/I&O





Vital Signs








  Date Time  Temp Pulse Resp B/P (MAP) Pulse Ox O2 Delivery O2 Flow Rate FiO2


 


8/18/19 10:36  56  176/86    


 


8/18/19 10:00 97.5  18  98   














I&O- Last 24 Hours up to 6 AM 


 


 8/18/19





 06:00


 


Intake Total 780 ml


 


Output Total 1750 ml


 


Balance -970 ml











Laboratory Data


24H LABS


Laboratory Tests 2


8/17/19 11:35: Bedside Glucose (Misc Panel) 117H


8/17/19 17:11: Bedside Glucose (Misc Panel) 89


8/17/19 21:12: Bedside Glucose (Misc Panel) 93


8/18/19 06:02: 


Nucleated Red Blood Cells % (auto) 0.0, Anion Gap 7L, Glomerular Filtration Rate

58.8, Blood Urea Nitrogen 16, Creatinine 1.29, Sodium Level 140, Potassium Level

3.9, Chloride Level 106, Carbon Dioxide Level 27, Calcium Level 8.9, Aspartate 

Amino Transf (AST/SGOT) 42H, Alanine Aminotransferase (ALT/SGPT) 68, Alkaline 

Phosphatase 56, Total Bilirubin 0.6, Total Protein 6.1L, Albumin 3.3, 

Albumin/Globulin Ratio 1.18


CBC/BMP


Laboratory Tests


8/18/19 06:02








Red Blood Count 5.51, Mean Corpuscular Volume 88.0, Mean Corpuscular Hemoglobin 

29.9, Mean Corpuscular Hemoglobin Concent 34.0, Red Cell Distribution Width 

13.0, Calcium Level 8.9, Aspartate Amino Transf (AST/SGOT) 42 H, Alanine 

Aminotransferase (ALT/SGPT) 68, Alkaline Phosphatase 56, Total Bilirubin 0.6, 

Total Protein 6.1 L, Albumin 3.3











JUSTIN HAZEL MD              Aug 18, 2019 11:12

## 2019-08-19 VITALS — SYSTOLIC BLOOD PRESSURE: 147 MMHG | DIASTOLIC BLOOD PRESSURE: 83 MMHG

## 2019-08-19 VITALS — SYSTOLIC BLOOD PRESSURE: 127 MMHG | DIASTOLIC BLOOD PRESSURE: 96 MMHG

## 2019-08-19 RX ADMIN — GLIMEPIRIDE SCH MG: 1 TABLET ORAL at 08:12

## 2019-08-19 RX ADMIN — ATENOLOL SCH MG: 25 TABLET ORAL at 08:16

## 2019-08-19 RX ADMIN — ONDANSETRON SCH MG: 2 INJECTION INTRAMUSCULAR; INTRAVENOUS at 10:00

## 2019-08-19 RX ADMIN — LEVOTHYROXINE SODIUM SCH MCG: 75 TABLET ORAL at 05:51

## 2019-08-19 RX ADMIN — ASPIRIN SCH MG: 81 TABLET ORAL at 08:11

## 2019-08-19 RX ADMIN — ATORVASTATIN CALCIUM SCH MG: 20 TABLET, FILM COATED ORAL at 17:33

## 2019-08-19 RX ADMIN — ONDANSETRON SCH MG: 2 INJECTION INTRAMUSCULAR; INTRAVENOUS at 01:00

## 2019-08-19 RX ADMIN — ONDANSETRON SCH MG: 2 INJECTION INTRAMUSCULAR; INTRAVENOUS at 05:51

## 2019-08-19 RX ADMIN — FLUTICASONE PROPIONATE SCH SPRAY: 50 SPRAY, METERED NASAL at 08:13

## 2019-08-19 RX ADMIN — METOCLOPRAMIDE SCH MG: 10 TABLET ORAL at 11:51

## 2019-08-19 RX ADMIN — CLOPIDOGREL BISULFATE SCH MG: 75 TABLET ORAL at 08:11

## 2019-08-19 RX ADMIN — METOCLOPRAMIDE SCH MG: 10 TABLET ORAL at 17:33

## 2019-08-19 RX ADMIN — INSULIN LISPRO SCH UNITS: 100 INJECTION, SOLUTION INTRAVENOUS; SUBCUTANEOUS at 11:52

## 2019-08-19 RX ADMIN — ONDANSETRON SCH MG: 2 INJECTION INTRAMUSCULAR; INTRAVENOUS at 17:34

## 2019-08-19 RX ADMIN — CANDESARTAN SCH MG: 16 TABLET ORAL at 08:11

## 2019-08-19 RX ADMIN — METOCLOPRAMIDE SCH MG: 10 TABLET ORAL at 07:30

## 2019-08-19 RX ADMIN — ONDANSETRON SCH MG: 2 INJECTION INTRAMUSCULAR; INTRAVENOUS at 14:00

## 2019-08-19 RX ADMIN — INSULIN LISPRO SCH UNITS: 100 INJECTION, SOLUTION INTRAVENOUS; SUBCUTANEOUS at 17:30

## 2019-08-19 RX ADMIN — LEVOTHYROXINE SODIUM SCH MCG: 100 TABLET ORAL at 05:51

## 2019-08-19 RX ADMIN — INSULIN LISPRO SCH UNITS: 100 INJECTION, SOLUTION INTRAVENOUS; SUBCUTANEOUS at 07:30

## 2019-08-19 RX ADMIN — ATENOLOL SCH MG: 25 TABLET ORAL at 08:12

## 2019-08-19 NOTE — REP
Gastric emptying nuclear scintigraphy:

 

History:  Nausea and vomiting.  Diabetes.

 

Technique:  1.1 mCi of technetium-99m sulfur colloid was ingested in two

scrambled eggs and 6 ounces of water and sequential anterior and posterior images

are acquired for an 89-minute imaging observation period.  Regions of interest

are drawn around the stomach to plot gastric emptying.

 

Scintigraphic findings:  Expected T1/2 is 90 minutes. 48 % emptying is observed

in this patient during the 89-minute imaging observation period, for a calculated

T1/2 in this patient of 86 minutes.

 

Impression:

 

Normal gastric emptying.

 

 

Electronically Signed by

Daniel Chandler MD 08/19/2019 03:30 P

## 2019-08-19 NOTE — DS.PDOC
Discharge Summary


General


Date of Admission


Aug 16, 2019 at 20:39


Date of Discharge


8/19/19





Discharge Summary


PROCEDURES PERFORMED DURING STAY: None.





ADMITTING DIAGNOSES: 


1. Intractable nausea, vomiting, diabetes mellitus.





DISCHARGE DIAGNOSES:


1. Intractable nausea, vomiting, diabetic gastroparesis, diabetes mellitus.





COMPLICATIONS/CHIEF COMPLAINT: Intractable Vomiting.





HISTORY OF PRESENT ILLNESS: This is a 69-year-old male who was transferred from 

[facility] for a higher level of care. He came to [facility] on August 9 

with nausea, and intractable vomiting. He had a CT of the abdomen that showed 

mesenteric panniculitis and was given a course of steroids, nevertheless, he 

continued to vomit at home. Shortly there after he presented to [facility] where he was admitted for management of near syncope along with nausea 

and vomiting. Based on their notes a Carotid duplex, CT of the head, and right 

upper quadrant ultrasound were done. The CDs have been sent here with him.


Currently the patient reports still feeling nauseous and had vomited on the way 

to the hospital. Since arrival, he has not vomited. He denies having bloody 

emesis.





HOSPITAL COURSE: Patient was admitted with the diagnosis of intractable nausea, 

vomiting which was most likely secondary to diabetic gastroparesis. Initially 

patient was kept nothing by mouth, was started on IV fluids and symptomatic 

treatment with Zofran was done.  initially was unable to tolerate any oral 

feeding, but slowly and progressively improved. Once the Reglan was started on 

him as every 6 hours. Once patient started tolerating oral feeding. IV fluids 

were discontinued and patient will be discharged home today as he is 

asymptomatic but he is still awaiting gastric function test today. Once the test

is completed. He'll be sent home and he can follow with his primary care 

physician as an outpatient. Patient will be discharged home on Reglan 10 mg by 

mouth every before meals and at bedtime. 





DISCHARGE MEDICATIONS: Please see below.


 


ALLERGIES: Please see below.





PHYSICAL EXAMINATION ON DISCHARGE:


VITAL SIGNS: Please see below.


GENERAL: Normal


HEENT: Within normal limits. PERRLA


NECK: Supple


CARDIOVASCULAR EXAMINATION: S1, S2, regular


RESPIRATORY EXAMINATION: Clear to A&P


ABDOMINAL EXAMINATION: [Benign. 


EXTREMITIES: No clubbing, cyanosis


SKIN: Within normal limits


NEUROLOGICAL EXAMINATION: No focal motor sensory deficit 


PSYCHIATRIC EXAMINATION: Normal





LABORATORY DATA: Please see below.





IMAGING: Gastric emptying study done, report pending





PROGNOSIS: Good





ACTIVITY: As tolerated.





DIET:  Carbohydrate consistent





DISCHARGE PLAN: Follow-up with PCP in one week





DISPOSITION: . Home





DISCHARGE INSTRUCTIONS:


1. As per discharge instructions.





ITEMS TO FOLLOWUP ON ON OUTPATIENT:


1. Follow with PCP in one week.





DISCHARGE CONDITION: Stable.





TIME SPENT ON DISCHARGE: 35 minutes.





Vital Signs/I&Os





Vital Signs








  Date Time  Temp Pulse Resp B/P (MAP) Pulse Ox O2 Delivery O2 Flow Rate FiO2


 


8/19/19 08:12  66  127/96    


 


8/19/19 06:00 98.0  17  98   














I&O- Last 24 Hours up to 6 AM 


 


 8/19/19





 06:00


 


Intake Total 1810 ml


 


Output Total 2200 ml


 


Balance -390 ml











Laboratory Data


Labs 24H


Laboratory Tests 2


8/18/19 17:20: Bedside Glucose (Misc Panel) 139H


8/18/19 20:31: Bedside Glucose (Misc Panel) 106


8/19/19 05:45: Bedside Glucose (Misc Panel) 132H


FSBS





Laboratory Tests








Test


 8/18/19


17:20 8/18/19


20:31 8/19/19


05:45 Range/Units


 


 


Bedside Glucose (Misc Panel) 139 106 132   MG/DL











Discharge Medications


Scheduled


Acetaminophen (Tylenol Extra Strength) 500 Mg Tablet, 1,000 MG PO QHS, 

(Reported)


Amlodipine Besylate (Amlodipine Besylate) 5 Mg Tab, 5 MG PO BID, (Reported)


Amoxicillin (Amoxicillin) 500 Mg Capsule, 500 MG PO TID, (Reported)


   PRESCRIBED 8/12/19 TID X7 DAYS 


Aspirin (Aspir 81) 81 Mg Tab, 81 MG PO DAILY, (Reported)


Atenolol (Atenolol) 25 Mg Tab, 25 MG PO BID, (Reported)


Atorvastatin Calcium (Lipitor) 20 Mg Tab, 20 MG PO QPM, (Reported)


Candesartan Cilexetil (Candesartan Cilexetil) 32 Mg Tablet, 16 MG PO BID, 

(Reported)


Clonidine HCl (Clonidine HCl) 0.2 Mg Tab, 0.2 MG PO BID, (Reported)


Clopidogrel Bisulfate (Plavix) 75 Mg Tab, 75 MG PO DAILY, (Reported)


Diphenhydramine HCl (Sleep-Aid) 25 Mg Capsule, 25 MG PO QHS, (Reported)


Ergocalciferol (Vitamin D2) (Vitamin D2) 50,000 Unit Capsule, 50,000 UNIT PO 

QWEEK, (Reported)


   MONDAYS 


Fenofibrate,Micronized (Fenofibrate) 200 Mg Capsule, 200 MG PO DAILY, (Reported)


Fluticasone Propionate (Flonase Allergy Relief) 9.9 Ml [place].susp, 1 SPRAY NARES

 DAILY, (Reported)


Glimepiride (Glimepiride) 1 Mg Tablet, 1 MG PO DAILY, (Reported)


Hydrochlorothiazide (Hydrochlorothiazide) 50 Mg Tab, 50 MG PO DAILY, (Reported)


Levothyroxine Sodium (Synthroid) 175 Mcg Tablet, 175 MCG PO QAM, (Reported)


Loratadine (Claritin) 10 Mg Tablet, 10 MG PO DAILY, (Reported)


Melatonin (Melatonin) 10 Mg Capsule, 10 MG PO QHS, (Reported)


Pantoprazole Sodium (Pantoprazole Sodium) 40 Mg Tablet.dr, 40 MG PO DAILY, 

(Reported)





Scheduled PRN


Nitroglycerin (Nitroglycerin) 0.4 Mg Tab.subl, 0.4 MG SL Q5MP PRN for CHEST 

PAIN, (Reported)


   1st sign of attack; may repeat every 5 mins; if pain persists after 3 in 15 

min, medical attention is recommended 


Ondansetron HCl (Ondansetron HCl) 8 Mg Tablet, 8 MG PO TID PRN for NAUSEA OR 

VOMITING, (Reported)





Allergies


Coded Allergies:  


     morphine (Verified  Allergy, Severe, heavy chest, 8/6/19)











JUSTIN HAZEL MD              Aug 19, 2019 13:26

## 2021-03-01 ENCOUNTER — HOSPITAL ENCOUNTER (OUTPATIENT)
Dept: HOSPITAL 53 - M LAB | Age: 71
End: 2021-03-01
Attending: INTERNAL MEDICINE
Payer: MEDICARE

## 2021-03-01 DIAGNOSIS — R07.9: Primary | ICD-10-CM

## 2021-03-01 LAB
BASOPHILS # BLD AUTO: 0.1 10^3/UL (ref 0–0.2)
BASOPHILS NFR BLD AUTO: 1 % (ref 0–1)
EOSINOPHIL # BLD AUTO: 0.1 10^3/UL (ref 0–0.5)
EOSINOPHIL NFR BLD AUTO: 1.7 % (ref 0–3)
HCT VFR BLD AUTO: 50.5 % (ref 42–52)
HGB BLD-MCNC: 16.5 G/DL (ref 13.5–17.5)
LYMPHOCYTES # BLD AUTO: 2.1 10^3/UL (ref 1.5–5)
LYMPHOCYTES NFR BLD AUTO: 29.2 % (ref 24–44)
MCH RBC QN AUTO: 28.7 PG (ref 27–33)
MCHC RBC AUTO-ENTMCNC: 32.7 G/DL (ref 32–36.5)
MCV RBC AUTO: 87.8 FL (ref 80–96)
MONOCYTES # BLD AUTO: 0.6 10^3/UL (ref 0–0.8)
MONOCYTES NFR BLD AUTO: 8.4 % (ref 2–8)
NEUTROPHILS # BLD AUTO: 4.2 10^3/UL (ref 1.5–8.5)
NEUTROPHILS NFR BLD AUTO: 59.1 % (ref 36–66)
PLATELET # BLD AUTO: 241 10^3/UL (ref 150–450)
RBC # BLD AUTO: 5.75 10^6/UL (ref 4.3–6.1)
WBC # BLD AUTO: 7.1 10^3/UL (ref 4–10)

## 2021-05-22 ENCOUNTER — HOSPITAL ENCOUNTER (OUTPATIENT)
Dept: HOSPITAL 53 - M LABSMTC | Age: 71
End: 2021-05-22
Attending: HOSPITALIST
Payer: MEDICARE

## 2021-05-22 ENCOUNTER — HOSPITAL ENCOUNTER (OUTPATIENT)
Dept: HOSPITAL 53 - M LAB | Age: 71
End: 2021-05-22
Attending: INTERNAL MEDICINE
Payer: MEDICARE

## 2021-05-22 DIAGNOSIS — E07.9: ICD-10-CM

## 2021-05-22 DIAGNOSIS — R07.9: Primary | ICD-10-CM

## 2021-05-22 DIAGNOSIS — Z11.52: Primary | ICD-10-CM

## 2021-05-22 LAB
BASOPHILS # BLD AUTO: 0.1 10^3/UL (ref 0–0.2)
BASOPHILS NFR BLD AUTO: 0.8 % (ref 0–1)
BUN SERPL-MCNC: 18 MG/DL (ref 7–18)
CALCIUM SERPL-MCNC: 8.8 MG/DL (ref 8.8–10.2)
CHLORIDE SERPL-SCNC: 108 MEQ/L (ref 98–107)
CO2 SERPL-SCNC: 26 MEQ/L (ref 21–32)
CREAT SERPL-MCNC: 1.14 MG/DL (ref 0.7–1.3)
EOSINOPHIL # BLD AUTO: 0.1 10^3/UL (ref 0–0.5)
EOSINOPHIL NFR BLD AUTO: 2 % (ref 0–3)
GFR SERPL CREATININE-BSD FRML MDRD: > 60 ML/MIN/{1.73_M2} (ref 42–?)
GLUCOSE SERPL-MCNC: 127 MG/DL (ref 70–100)
HCT VFR BLD AUTO: 49.3 % (ref 42–52)
HGB BLD-MCNC: 16.6 G/DL (ref 13.5–17.5)
LYMPHOCYTES # BLD AUTO: 1.7 10^3/UL (ref 1.5–5)
LYMPHOCYTES NFR BLD AUTO: 27.9 % (ref 24–44)
MCH RBC QN AUTO: 30 PG (ref 27–33)
MCHC RBC AUTO-ENTMCNC: 33.7 G/DL (ref 32–36.5)
MCV RBC AUTO: 89.2 FL (ref 80–96)
MONOCYTES # BLD AUTO: 0.5 10^3/UL (ref 0–0.8)
MONOCYTES NFR BLD AUTO: 8.4 % (ref 2–8)
NEUTROPHILS # BLD AUTO: 3.7 10^3/UL (ref 1.5–8.5)
NEUTROPHILS NFR BLD AUTO: 60.4 % (ref 36–66)
PLATELET # BLD AUTO: 220 10^3/UL (ref 150–450)
POTASSIUM SERPL-SCNC: 4.3 MEQ/L (ref 3.5–5.1)
RBC # BLD AUTO: 5.53 10^6/UL (ref 4.3–6.1)
SODIUM SERPL-SCNC: 139 MEQ/L (ref 136–145)
WBC # BLD AUTO: 6.1 10^3/UL (ref 4–10)

## 2021-05-24 LAB
EST. AVERAGE GLUCOSE BLD GHB EST-MCNC: 131 MG/DL (ref 60–110)
T4 FREE SERPL-MCNC: 1.17 NG/DL (ref 0.76–1.46)
TSH SERPL DL<=0.005 MIU/L-ACNC: 0.7 UIU/ML (ref 0.36–3.74)

## 2021-08-08 ENCOUNTER — HOSPITAL ENCOUNTER (OUTPATIENT)
Dept: HOSPITAL 53 - M RAD | Age: 71
End: 2021-08-08
Attending: NURSE PRACTITIONER
Payer: MEDICARE

## 2021-08-08 DIAGNOSIS — Z53.8: ICD-10-CM

## 2021-08-08 DIAGNOSIS — S06.5X0A: Primary | ICD-10-CM

## 2022-02-10 ENCOUNTER — HOSPITAL ENCOUNTER (OUTPATIENT)
Dept: HOSPITAL 53 - M RAD | Age: 72
End: 2022-02-10
Attending: NURSE PRACTITIONER
Payer: MEDICARE

## 2022-02-10 DIAGNOSIS — I71.4: Primary | ICD-10-CM

## 2022-04-29 ENCOUNTER — HOSPITAL ENCOUNTER (OUTPATIENT)
Dept: HOSPITAL 53 - M LAB REF | Age: 72
End: 2022-04-29
Attending: PHYSICIAN ASSISTANT
Payer: MEDICARE

## 2022-04-29 DIAGNOSIS — J02.9: Primary | ICD-10-CM

## 2022-04-29 DIAGNOSIS — R50.9: ICD-10-CM

## 2022-09-22 ENCOUNTER — HOSPITAL ENCOUNTER (OUTPATIENT)
Dept: HOSPITAL 53 - M RAD | Age: 72
End: 2022-09-22
Attending: NURSE PRACTITIONER
Payer: MEDICARE

## 2022-09-22 DIAGNOSIS — R51.9: Primary | ICD-10-CM

## 2022-10-04 ENCOUNTER — HOSPITAL ENCOUNTER (OUTPATIENT)
Dept: HOSPITAL 53 - M LAB | Age: 72
End: 2022-10-04
Attending: NURSE PRACTITIONER
Payer: MEDICARE

## 2022-10-04 DIAGNOSIS — I25.118: Primary | ICD-10-CM

## 2022-10-04 LAB
BASOPHILS # BLD AUTO: 0.1 10^3/UL (ref 0–0.2)
BASOPHILS NFR BLD AUTO: 1 % (ref 0–1)
BUN SERPL-MCNC: 22 MG/DL (ref 7–18)
CALCIUM SERPL-MCNC: 9.6 MG/DL (ref 8.8–10.2)
CHLORIDE SERPL-SCNC: 107 MEQ/L (ref 98–107)
CO2 SERPL-SCNC: 27 MEQ/L (ref 21–32)
CREAT SERPL-MCNC: 1.48 MG/DL (ref 0.7–1.3)
EOSINOPHIL # BLD AUTO: 0.1 10^3/UL (ref 0–0.5)
EOSINOPHIL NFR BLD AUTO: 1.4 % (ref 0–3)
GFR SERPL CREATININE-BSD FRML MDRD: 49.7 ML/MIN/{1.73_M2} (ref 42–?)
GLUCOSE SERPL-MCNC: 105 MG/DL (ref 70–100)
HCT VFR BLD AUTO: 47.3 % (ref 42–52)
HGB BLD-MCNC: 15.9 G/DL (ref 13.5–17.5)
LYMPHOCYTES # BLD AUTO: 1.9 10^3/UL (ref 1.5–5)
LYMPHOCYTES NFR BLD AUTO: 32.1 % (ref 24–44)
MCH RBC QN AUTO: 30.6 PG (ref 27–33)
MCHC RBC AUTO-ENTMCNC: 33.6 G/DL (ref 32–36.5)
MCV RBC AUTO: 91 FL (ref 80–96)
MONOCYTES # BLD AUTO: 0.6 10^3/UL (ref 0–0.8)
MONOCYTES NFR BLD AUTO: 10 % (ref 2–8)
NEUTROPHILS # BLD AUTO: 3.2 10^3/UL (ref 1.5–8.5)
NEUTROPHILS NFR BLD AUTO: 54.8 % (ref 36–66)
PLATELET # BLD AUTO: 243 10^3/UL (ref 150–450)
POTASSIUM SERPL-SCNC: 4.8 MEQ/L (ref 3.5–5.1)
RBC # BLD AUTO: 5.2 10^6/UL (ref 4.3–6.1)
SODIUM SERPL-SCNC: 137 MEQ/L (ref 136–145)
WBC # BLD AUTO: 5.9 10^3/UL (ref 4–10)

## 2023-05-25 ENCOUNTER — HOSPITAL ENCOUNTER (OUTPATIENT)
Dept: HOSPITAL 53 - M WHC | Age: 73
End: 2023-05-25
Attending: INTERNAL MEDICINE
Payer: MEDICARE

## 2023-05-25 DIAGNOSIS — I10: Primary | ICD-10-CM

## 2024-08-25 ENCOUNTER — HOSPITAL ENCOUNTER (EMERGENCY)
Dept: HOSPITAL 53 - M ED | Age: 74
Discharge: HOME | End: 2024-08-25
Payer: MEDICARE

## 2024-08-25 VITALS — SYSTOLIC BLOOD PRESSURE: 179 MMHG | DIASTOLIC BLOOD PRESSURE: 96 MMHG | TEMPERATURE: 98 F | OXYGEN SATURATION: 99 %

## 2024-08-25 VITALS — BODY MASS INDEX: 27.56 KG/M2 | WEIGHT: 196.87 LBS | HEIGHT: 71 IN

## 2024-08-25 DIAGNOSIS — I25.2: ICD-10-CM

## 2024-08-25 DIAGNOSIS — Z79.82: ICD-10-CM

## 2024-08-25 DIAGNOSIS — E78.5: ICD-10-CM

## 2024-08-25 DIAGNOSIS — I11.0: ICD-10-CM

## 2024-08-25 DIAGNOSIS — Z87.891: ICD-10-CM

## 2024-08-25 DIAGNOSIS — Z88.5: ICD-10-CM

## 2024-08-25 DIAGNOSIS — R11.2: Primary | ICD-10-CM

## 2024-08-25 DIAGNOSIS — Z79.899: ICD-10-CM

## 2024-08-25 LAB
ALBUMIN SERPL BCG-MCNC: 3.6 G/DL (ref 3.2–5.2)
ALP SERPL-CCNC: 92 U/L (ref 46–116)
ALT SERPL W P-5'-P-CCNC: 39 U/L (ref 7–40)
AMYLASE SERPL-CCNC: 52 U/L (ref 30–118)
APTT BLD: 32 SECONDS (ref 24.8–34.2)
AST SERPL-CCNC: 27 U/L (ref ?–34)
BASOPHILS # BLD AUTO: 0 10^3/UL (ref 0–0.2)
BASOPHILS NFR BLD AUTO: 0.3 % (ref 0–1)
BILIRUB CONJ SERPL-MCNC: 0.2 MG/DL (ref ?–0.4)
BILIRUB SERPL-MCNC: 0.6 MG/DL (ref 0.3–1.2)
BUN SERPL-MCNC: 18 MG/DL (ref 9–23)
CALCIUM SERPL-MCNC: 9.9 MG/DL (ref 8.3–10.6)
CHLORIDE SERPL-SCNC: 107 MMOL/L (ref 98–107)
CK MB CFR.DF SERPL CALC: 0.9
CK MB CFR.DF SERPL CALC: 1.06
CK MB SERPL-MCNC: < 1 NG/ML (ref ?–3.6)
CK MB SERPL-MCNC: < 1 NG/ML (ref ?–3.6)
CK SERPL-CCNC: 110 U/L (ref 46–171)
CK SERPL-CCNC: 94 U/L (ref 46–171)
CO2 SERPL-SCNC: 27 MMOL/L (ref 20–31)
CREAT SERPL-MCNC: 1 MG/DL (ref 0.7–1.3)
EOSINOPHIL # BLD AUTO: 0 10^3/UL (ref 0–0.5)
EOSINOPHIL NFR BLD AUTO: 0.1 % (ref 0–3)
GFR SERPL CREATININE-BSD FRML MDRD: > 60 ML/MIN/{1.73_M2} (ref 42–?)
GLUCOSE SERPL-MCNC: 137 MG/DL (ref 74–106)
HCT VFR BLD AUTO: 44.8 % (ref 42–52)
HGB BLD-MCNC: 15.5 G/DL (ref 13.5–17.5)
INR PPP: 1.05
LIPASE SERPL-CCNC: 35 U/L (ref 12–53)
LYMPHOCYTES # BLD AUTO: 1.3 10^3/UL (ref 1.5–5)
LYMPHOCYTES NFR BLD AUTO: 16.7 % (ref 24–44)
MCH RBC QN AUTO: 30.9 PG (ref 27–33)
MCHC RBC AUTO-ENTMCNC: 34.6 G/DL (ref 32–36.5)
MCV RBC AUTO: 89.4 FL (ref 80–96)
MONOCYTES # BLD AUTO: 0.5 10^3/UL (ref 0–0.8)
MONOCYTES NFR BLD AUTO: 6.3 % (ref 2–8)
NEUTROPHILS # BLD AUTO: 5.7 10^3/UL (ref 1.5–8.5)
NEUTROPHILS NFR BLD AUTO: 76.1 % (ref 36–66)
PLATELET # BLD AUTO: 231 10^3/UL (ref 150–450)
POTASSIUM SERPL-SCNC: 4.1 MMOL/L (ref 3.5–5.1)
PROT SERPL-MCNC: 6.7 G/DL (ref 5.7–8.2)
PROTHROMBIN TIME: 13.3 SECONDS (ref 12.5–14.5)
RBC # BLD AUTO: 5.01 10^6/UL (ref 4.3–6.1)
SODIUM SERPL-SCNC: 138 MMOL/L (ref 136–145)
WBC # BLD AUTO: 7.5 10^3/UL (ref 4–10)

## 2024-08-25 PROCEDURE — 93041 RHYTHM ECG TRACING: CPT

## 2024-08-25 PROCEDURE — 85610 PROTHROMBIN TIME: CPT

## 2024-08-25 PROCEDURE — 87581 M.PNEUMON DNA AMP PROBE: CPT

## 2024-08-25 PROCEDURE — 87633 RESP VIRUS 12-25 TARGETS: CPT

## 2024-08-25 PROCEDURE — 80048 BASIC METABOLIC PNL TOTAL CA: CPT

## 2024-08-25 PROCEDURE — 70450 CT HEAD/BRAIN W/O DYE: CPT

## 2024-08-25 PROCEDURE — 82553 CREATINE MB FRACTION: CPT

## 2024-08-25 PROCEDURE — 80047 BASIC METABLC PNL IONIZED CA: CPT

## 2024-08-25 PROCEDURE — 83605 ASSAY OF LACTIC ACID: CPT

## 2024-08-25 PROCEDURE — 80076 HEPATIC FUNCTION PANEL: CPT

## 2024-08-25 PROCEDURE — 82550 ASSAY OF CK (CPK): CPT

## 2024-08-25 PROCEDURE — 83690 ASSAY OF LIPASE: CPT

## 2024-08-25 PROCEDURE — 87486 CHLMYD PNEUM DNA AMP PROBE: CPT

## 2024-08-25 PROCEDURE — 84484 ASSAY OF TROPONIN QUANT: CPT

## 2024-08-25 PROCEDURE — 93005 ELECTROCARDIOGRAM TRACING: CPT

## 2024-08-25 PROCEDURE — 82150 ASSAY OF AMYLASE: CPT

## 2024-08-25 PROCEDURE — 96374 THER/PROPH/DIAG INJ IV PUSH: CPT

## 2024-08-25 PROCEDURE — 87798 DETECT AGENT NOS DNA AMP: CPT

## 2024-08-25 PROCEDURE — 85025 COMPLETE CBC W/AUTO DIFF WBC: CPT

## 2024-08-25 PROCEDURE — 99284 EMERGENCY DEPT VISIT MOD MDM: CPT

## 2024-08-25 PROCEDURE — 81001 URINALYSIS AUTO W/SCOPE: CPT

## 2024-08-25 PROCEDURE — 85730 THROMBOPLASTIN TIME PARTIAL: CPT

## 2024-08-25 PROCEDURE — 74177 CT ABD & PELVIS W/CONTRAST: CPT

## 2024-08-25 RX ADMIN — SODIUM CHLORIDE SCH MLS/HR: 9 INJECTION, SOLUTION INTRAVENOUS at 12:50

## 2024-08-25 RX ADMIN — ONDANSETRON ONE MG: 2 INJECTION INTRAMUSCULAR; INTRAVENOUS at 13:28

## 2025-07-25 ENCOUNTER — HOSPITAL ENCOUNTER (OUTPATIENT)
Dept: HOSPITAL 53 - M OPP | Age: 75
Discharge: HOME | End: 2025-07-25
Attending: SURGERY
Payer: MEDICARE

## 2025-07-25 VITALS — BODY MASS INDEX: 30.6 KG/M2 | HEIGHT: 71 IN | WEIGHT: 218.6 LBS

## 2025-07-25 VITALS — OXYGEN SATURATION: 96 % | DIASTOLIC BLOOD PRESSURE: 82 MMHG | TEMPERATURE: 97.3 F | SYSTOLIC BLOOD PRESSURE: 155 MMHG

## 2025-07-25 DIAGNOSIS — K57.30: ICD-10-CM

## 2025-07-25 DIAGNOSIS — K64.8: ICD-10-CM

## 2025-07-25 DIAGNOSIS — I25.2: ICD-10-CM

## 2025-07-25 DIAGNOSIS — Z79.84: ICD-10-CM

## 2025-07-25 DIAGNOSIS — Z79.899: ICD-10-CM

## 2025-07-25 DIAGNOSIS — Z79.890: ICD-10-CM

## 2025-07-25 DIAGNOSIS — Z79.02: ICD-10-CM

## 2025-07-25 DIAGNOSIS — Z85.850: ICD-10-CM

## 2025-07-25 DIAGNOSIS — Z79.82: ICD-10-CM

## 2025-07-25 DIAGNOSIS — Z88.5: ICD-10-CM

## 2025-07-25 DIAGNOSIS — M19.90: ICD-10-CM

## 2025-07-25 DIAGNOSIS — E11.9: ICD-10-CM

## 2025-07-25 DIAGNOSIS — Z87.891: ICD-10-CM

## 2025-07-25 DIAGNOSIS — I10: ICD-10-CM

## 2025-07-25 DIAGNOSIS — Z92.3: ICD-10-CM

## 2025-07-25 DIAGNOSIS — D12.3: Primary | ICD-10-CM

## 2025-07-25 DIAGNOSIS — I25.10: ICD-10-CM

## 2025-07-25 DIAGNOSIS — Z91.048: ICD-10-CM

## 2025-07-25 DIAGNOSIS — E78.00: ICD-10-CM
